# Patient Record
Sex: MALE | Race: WHITE | NOT HISPANIC OR LATINO | Employment: OTHER | URBAN - NONMETROPOLITAN AREA
[De-identification: names, ages, dates, MRNs, and addresses within clinical notes are randomized per-mention and may not be internally consistent; named-entity substitution may affect disease eponyms.]

---

## 2023-04-01 ENCOUNTER — HOSPITAL ENCOUNTER (EMERGENCY)
Facility: HOSPITAL | Age: 28
Discharge: HOME | End: 2023-04-02
Attending: EMERGENCY MEDICINE

## 2023-04-01 DIAGNOSIS — R41.82 AMS (ALTERED MENTAL STATUS): ICD-10-CM

## 2023-04-01 DIAGNOSIS — F10.929 ACUTE ALCOHOL INTOXICATION: Primary | ICD-10-CM

## 2023-04-01 LAB
ALBUMIN SERPL BCP-MCNC: 3.9 G/DL (ref 3.5–5.2)
ALP SERPL-CCNC: 68 U/L (ref 55–135)
ALT SERPL W/O P-5'-P-CCNC: 36 U/L (ref 10–44)
AMPHET+METHAMPHET UR QL: NEGATIVE
ANION GAP SERPL CALC-SCNC: 4 MMOL/L (ref 8–16)
APAP SERPL-MCNC: <2 UG/ML (ref 10–20)
AST SERPL-CCNC: 14 U/L (ref 10–40)
BARBITURATES UR QL SCN>200 NG/ML: NEGATIVE
BASOPHILS # BLD AUTO: 0.08 K/UL (ref 0–0.2)
BASOPHILS NFR BLD: 0.8 % (ref 0–1.9)
BENZODIAZ UR QL SCN>200 NG/ML: NEGATIVE
BILIRUB SERPL-MCNC: 0.5 MG/DL (ref 0.1–1)
BILIRUB UR QL STRIP: NEGATIVE
BUN SERPL-MCNC: 7 MG/DL (ref 6–20)
BZE UR QL SCN: NEGATIVE
CALCIUM SERPL-MCNC: 8.1 MG/DL (ref 8.7–10.5)
CANNABINOIDS UR QL SCN: NEGATIVE
CHLORIDE SERPL-SCNC: 111 MMOL/L (ref 95–110)
CLARITY UR: CLEAR
CO2 SERPL-SCNC: 28 MMOL/L (ref 23–29)
COLOR UR: YELLOW
CREAT SERPL-MCNC: 1 MG/DL (ref 0.5–1.4)
CREAT UR-MCNC: 22.5 MG/DL (ref 23–375)
DIFFERENTIAL METHOD: ABNORMAL
EOSINOPHIL # BLD AUTO: 0 K/UL (ref 0–0.5)
EOSINOPHIL NFR BLD: 0.3 % (ref 0–8)
ERYTHROCYTE [DISTWIDTH] IN BLOOD BY AUTOMATED COUNT: 11.9 % (ref 11.5–14.5)
EST. GFR  (NO RACE VARIABLE): >60 ML/MIN/1.73 M^2
ETHANOL SERPL-MCNC: 363 MG/DL
GLUCOSE SERPL-MCNC: 101 MG/DL (ref 70–110)
GLUCOSE UR QL STRIP: NEGATIVE
HCT VFR BLD AUTO: 47.2 % (ref 40–54)
HGB BLD-MCNC: 16.1 G/DL (ref 14–18)
HGB UR QL STRIP: NEGATIVE
IMM GRANULOCYTES # BLD AUTO: 0.06 K/UL (ref 0–0.04)
IMM GRANULOCYTES NFR BLD AUTO: 0.6 % (ref 0–0.5)
KETONES UR QL STRIP: NEGATIVE
LEUKOCYTE ESTERASE UR QL STRIP: NEGATIVE
LYMPHOCYTES # BLD AUTO: 2.5 K/UL (ref 1–4.8)
LYMPHOCYTES NFR BLD: 26.1 % (ref 18–48)
MAGNESIUM SERPL-MCNC: 2.5 MG/DL (ref 1.6–2.6)
MCH RBC QN AUTO: 29.5 PG (ref 27–31)
MCHC RBC AUTO-ENTMCNC: 34.1 G/DL (ref 32–36)
MCV RBC AUTO: 87 FL (ref 82–98)
METHADONE UR QL SCN>300 NG/ML: NEGATIVE
MONOCYTES # BLD AUTO: 0.5 K/UL (ref 0.3–1)
MONOCYTES NFR BLD: 4.8 % (ref 4–15)
NEUTROPHILS # BLD AUTO: 6.4 K/UL (ref 1.8–7.7)
NEUTROPHILS NFR BLD: 67.4 % (ref 38–73)
NITRITE UR QL STRIP: NEGATIVE
NRBC BLD-RTO: 0 /100 WBC
OPIATES UR QL SCN: NEGATIVE
PCP UR QL SCN>25 NG/ML: NEGATIVE
PH UR STRIP: 6 [PH] (ref 5–8)
PLATELET # BLD AUTO: 286 K/UL (ref 150–450)
PMV BLD AUTO: 9.5 FL (ref 9.2–12.9)
POCT GLUCOSE: 98 MG/DL (ref 70–110)
POTASSIUM SERPL-SCNC: 3.6 MMOL/L (ref 3.5–5.1)
PROT SERPL-MCNC: 8.1 G/DL (ref 6–8.4)
PROT UR QL STRIP: NEGATIVE
RBC # BLD AUTO: 5.45 M/UL (ref 4.6–6.2)
SALICYLATES SERPL-MCNC: <1.7 MG/DL (ref 15–30)
SODIUM SERPL-SCNC: 143 MMOL/L (ref 136–145)
SP GR UR STRIP: <=1.005 (ref 1–1.03)
TOXICOLOGY INFORMATION: ABNORMAL
TROPONIN I SERPL DL<=0.01 NG/ML-MCNC: 79.4 PG/ML (ref 0–60)
URN SPEC COLLECT METH UR: ABNORMAL
UROBILINOGEN UR STRIP-ACNC: NEGATIVE EU/DL
WBC # BLD AUTO: 9.5 K/UL (ref 3.9–12.7)

## 2023-04-01 PROCEDURE — 80307 DRUG TEST PRSMV CHEM ANLYZR: CPT | Performed by: EMERGENCY MEDICINE

## 2023-04-01 PROCEDURE — 36415 COLL VENOUS BLD VENIPUNCTURE: CPT | Performed by: EMERGENCY MEDICINE

## 2023-04-01 PROCEDURE — 96360 HYDRATION IV INFUSION INIT: CPT

## 2023-04-01 PROCEDURE — 82962 GLUCOSE BLOOD TEST: CPT

## 2023-04-01 PROCEDURE — 25000003 PHARM REV CODE 250: Performed by: EMERGENCY MEDICINE

## 2023-04-01 PROCEDURE — 81003 URINALYSIS AUTO W/O SCOPE: CPT | Mod: 59 | Performed by: EMERGENCY MEDICINE

## 2023-04-01 PROCEDURE — 80143 DRUG ASSAY ACETAMINOPHEN: CPT | Performed by: EMERGENCY MEDICINE

## 2023-04-01 PROCEDURE — 85025 COMPLETE CBC W/AUTO DIFF WBC: CPT | Performed by: EMERGENCY MEDICINE

## 2023-04-01 PROCEDURE — 82077 ASSAY SPEC XCP UR&BREATH IA: CPT | Performed by: EMERGENCY MEDICINE

## 2023-04-01 PROCEDURE — 84484 ASSAY OF TROPONIN QUANT: CPT | Performed by: EMERGENCY MEDICINE

## 2023-04-01 PROCEDURE — 80179 DRUG ASSAY SALICYLATE: CPT | Performed by: EMERGENCY MEDICINE

## 2023-04-01 PROCEDURE — 83735 ASSAY OF MAGNESIUM: CPT | Performed by: EMERGENCY MEDICINE

## 2023-04-01 PROCEDURE — 99285 EMERGENCY DEPT VISIT HI MDM: CPT | Mod: 25

## 2023-04-01 PROCEDURE — 80053 COMPREHEN METABOLIC PANEL: CPT | Performed by: EMERGENCY MEDICINE

## 2023-04-01 RX ADMIN — SODIUM CHLORIDE 1000 ML: 9 INJECTION, SOLUTION INTRAVENOUS at 10:04

## 2023-04-02 VITALS
BODY MASS INDEX: 27.64 KG/M2 | WEIGHT: 172 LBS | HEIGHT: 66 IN | RESPIRATION RATE: 19 BRPM | HEART RATE: 78 BPM | OXYGEN SATURATION: 100 % | SYSTOLIC BLOOD PRESSURE: 103 MMHG | DIASTOLIC BLOOD PRESSURE: 58 MMHG | TEMPERATURE: 97 F

## 2023-04-02 LAB
ETHANOL SERPL-MCNC: 299 MG/DL
TROPONIN I SERPL DL<=0.01 NG/ML-MCNC: 80.1 PG/ML (ref 0–60)

## 2023-04-02 PROCEDURE — 84484 ASSAY OF TROPONIN QUANT: CPT | Performed by: EMERGENCY MEDICINE

## 2023-04-02 PROCEDURE — 82077 ASSAY SPEC XCP UR&BREATH IA: CPT | Performed by: EMERGENCY MEDICINE

## 2023-04-02 PROCEDURE — 36415 COLL VENOUS BLD VENIPUNCTURE: CPT | Performed by: EMERGENCY MEDICINE

## 2023-04-02 NOTE — ED NOTES
Pt left ER in stable condition.  Pt ambulated with a steady even gait and was picked up by his employee who drove him home.

## 2023-04-02 NOTE — ED PROVIDER NOTES
"EMERGENCY DEPARTMENT HISTORY AND PHYSICAL EXAM     This note is dictated on M*[employer] word recognition program.  There are word recognition mistakes and grammatical errors that are occasionally missed on review.     Date: 4/1/2023   Patient Name: Misael Zheng       History of Presenting Illness      Chief Complaint   Patient presents with    Alcohol Intoxication     Pt to ED via [facility] - medic reports pt was found in a friend's front yard (awake to sternal rub) -  "has been drinking alcohol all day, ingested edibles and Vyvanse." Pt is awake and alert to voice on ED arrival.         2225   Misael Zheng is a 28 y.o. male with PMHX of unknown who presents to the emergency department C/O intoxication.    Patient arrives by EMS due to intoxication.  Patient was reportedly drinking all day.  Patient also had edibles.  He took Vyvanse.      Patient was found in friends front yd intoxicated.  Emergency department patient is intoxicated.  Minimally cooperative.  Sleeping but awakens to voice.    PCP: Primary Doctor No        No current facility-administered medications for this encounter.     No current outpatient medications on file.           Past History     Past Medical History:   No past medical history on file.     Past Surgical History:   No past surgical history on file.     Family History:   No family history on file.     Social History:         Allergies:   Review of patient's allergies indicates:  No Known Allergies       Review of Systems   Review of Systems   See HPI for pertinent positives and negatives       Physical Exam     Vitals:    04/02/23 0045 04/02/23 0130 04/02/23 0335 04/02/23 0400   BP: 112/66 (!) 92/46 108/66 (!) 103/58   Pulse: 97 91 85 78   Resp: (!) 23 (!) 21 (!) 29 19   Temp:       TempSrc:       SpO2: 100% 97% 100% 100%   Weight:       Height:          Physical Exam  Vitals and nursing note reviewed.   Constitutional:       General: He is not in acute distress.     " Appearance: Normal appearance. He is well-developed. He is not ill-appearing.   HENT:      Head: Normocephalic and atraumatic.   Eyes:      Extraocular Movements: Extraocular movements intact.      Conjunctiva/sclera: Conjunctivae normal.   Cardiovascular:      Rate and Rhythm: Normal rate.      Pulses: Normal pulses.      Heart sounds: Normal heart sounds.   Pulmonary:      Effort: Pulmonary effort is normal. No respiratory distress.      Breath sounds: Normal breath sounds.   Abdominal:      General: There is no distension.      Palpations: Abdomen is soft.      Tenderness: There is no abdominal tenderness. There is no guarding or rebound.   Musculoskeletal:         General: No deformity or signs of injury.      Cervical back: Normal range of motion. No rigidity.   Skin:     General: Skin is dry.      Coloration: Skin is not pale.      Findings: No rash.   Neurological:      General: No focal deficit present.      Mental Status: He is disoriented and confused.      GCS: GCS eye subscore is 3. GCS verbal subscore is 4. GCS motor subscore is 6.      Motor: No weakness or abnormal muscle tone.      Coordination: Coordination abnormal.            Diagnostic Study Results      Labs -   Recent Results (from the past 12 hour(s))   POCT glucose    Collection Time: 04/01/23  9:46 PM   Result Value Ref Range    POCT Glucose 98 70 - 110 mg/dL   CBC auto differential    Collection Time: 04/01/23  9:47 PM   Result Value Ref Range    WBC 9.50 3.90 - 12.70 K/uL    RBC 5.45 4.60 - 6.20 M/uL    Hemoglobin 16.1 14.0 - 18.0 g/dL    Hematocrit 47.2 40.0 - 54.0 %    MCV 87 82 - 98 fL    MCH 29.5 27.0 - 31.0 pg    MCHC 34.1 32.0 - 36.0 g/dL    RDW 11.9 11.5 - 14.5 %    Platelets 286 150 - 450 K/uL    MPV 9.5 9.2 - 12.9 fL    Immature Granulocytes 0.6 (H) 0.0 - 0.5 %    Gran # (ANC) 6.4 1.8 - 7.7 K/uL    Immature Grans (Abs) 0.06 (H) 0.00 - 0.04 K/uL    Lymph # 2.5 1.0 - 4.8 K/uL    Mono # 0.5 0.3 - 1.0 K/uL    Eos # 0.0 0.0 - 0.5 K/uL     Baso # 0.08 0.00 - 0.20 K/uL    nRBC 0 0 /100 WBC    Gran % 67.4 38.0 - 73.0 %    Lymph % 26.1 18.0 - 48.0 %    Mono % 4.8 4.0 - 15.0 %    Eosinophil % 0.3 0.0 - 8.0 %    Basophil % 0.8 0.0 - 1.9 %    Differential Method Automated    Comprehensive metabolic panel    Collection Time: 04/01/23  9:47 PM   Result Value Ref Range    Sodium 143 136 - 145 mmol/L    Potassium 3.6 3.5 - 5.1 mmol/L    Chloride 111 (H) 95 - 110 mmol/L    CO2 28 23 - 29 mmol/L    Glucose 101 70 - 110 mg/dL    BUN 7 6 - 20 mg/dL    Creatinine 1.0 0.5 - 1.4 mg/dL    Calcium 8.1 (L) 8.7 - 10.5 mg/dL    Total Protein 8.1 6.0 - 8.4 g/dL    Albumin 3.9 3.5 - 5.2 g/dL    Total Bilirubin 0.5 0.1 - 1.0 mg/dL    Alkaline Phosphatase 68 55 - 135 U/L    AST 14 10 - 40 U/L    ALT 36 10 - 44 U/L    Anion Gap 4 (L) 8 - 16 mmol/L    eGFR >60.0 >60 mL/min/1.73 m^2   Ethanol    Collection Time: 04/01/23  9:47 PM   Result Value Ref Range    Alcohol, Serum 363 (HH) <10 mg/dL   Acetaminophen level    Collection Time: 04/01/23  9:47 PM   Result Value Ref Range    Acetaminophen (Tylenol), Serum <2.0 (L) 10.0 - 20.0 ug/mL   Salicylate level    Collection Time: 04/01/23  9:47 PM   Result Value Ref Range    Salicylate Lvl <1.7 (L) 15.0 - 30.0 mg/dL   Troponin I High Sensitivity    Collection Time: 04/01/23  9:47 PM   Result Value Ref Range    Troponin I High Sensitivity 79.4 (H) 0.0 - 60.0 pg/mL   Magnesium    Collection Time: 04/01/23  9:47 PM   Result Value Ref Range    Magnesium 2.5 1.6 - 2.6 mg/dL   Urinalysis - Clean Catch    Collection Time: 04/01/23 10:02 PM   Result Value Ref Range    Specimen UA Urine, Clean Catch     Color, UA Yellow Yellow, Straw, Kim    Appearance, UA Clear Clear    pH, UA 6.0 5.0 - 8.0    Specific Gravity, UA <=1.005 (A) 1.005 - 1.030    Protein, UA Negative Negative    Glucose, UA Negative Negative    Ketones, UA Negative Negative    Bilirubin (UA) Negative Negative    Occult Blood UA Negative Negative    Nitrite, UA Negative Negative     Urobilinogen, UA Negative <2.0 EU/dL    Leukocytes, UA Negative Negative   Drug screen panel, emergency    Collection Time: 04/01/23 10:02 PM   Result Value Ref Range    Benzodiazepines Negative Negative    Methadone metabolites Negative Negative    Cocaine (Metab.) Negative Negative    Opiate Scrn, Ur Negative Negative    Barbiturate Screen, Ur Negative Negative    Amphetamine Screen, Ur Negative Negative    THC Negative Negative    Phencyclidine Negative Negative    Creatinine, Urine 22.5 (L) 23.0 - 375.0 mg/dL    Toxicology Information SEE COMMENT    TROPONIN I HIGH SENSITIVITY    Collection Time: 04/02/23  1:00 AM   Result Value Ref Range    Troponin I High Sensitivity 80.1 (H) 0.0 - 60.0 pg/mL   Ethanol    Collection Time: 04/02/23  1:00 AM   Result Value Ref Range    Alcohol, Serum 299 (H) <10 mg/dL        Radiologic Studies -    No orders to display        Medications given in the ED-   Medications   sodium chloride 0.9% bolus 1,000 mL 1,000 mL (0 mLs Intravenous Stopped 4/1/23 2302)   sodium chloride 0.9% bolus 1,000 mL 1,000 mL (0 mLs Intravenous Stopped 4/1/23 2359)           Medical Decision Making    I am the first provider for this patient.     I reviewed the vital signs, available nursing notes, past medical history, past surgical history, family history and social history.     Vital Signs:  Reviewed the patient's vital signs.     Pulse Oximetry Analysis and Interpretation:    98% on Room Air, normal      EKG Interpretation: (Per my independent interpretation, pending formal read)   Interpreted by Levi Cuba MD at 2359   Sinus tachycardia rate of 102, normal intervals, normal axis, no ST elevation       External Test Results (Pertinent to encounter):    Records Reviewed: Nursing Notes and PMPAware     History Obtained By: EMS    Provider Notes: Misael Zheng is a 28 y.o. male with intoxication    Co-morbidities Considered:  No known comorbidities, limited history    Differential  Diagnosis:  Intoxication      ED Course:    Labs reviewed.  Markedly elevated alcohol.  Chemistry and tox panel unremarkable.  Mildly elevated high sensitive troponin likely demand/dehydration.  Reviewed PMPAware - no vyvanse rx seen.     1:51 AM  Repeat trop unchanged. Patient sleeping.     4:25 AM  Patient awake alert and oriented.  Admits to alcohol use tonight.  Denies complaints.  Requesting discharge.  Patient appears to be mentating appropriately.  Discussed with him his plan.  Patient says he will go home and sleep.  Instructed patient to have no more alcohol tonight.  Patient is in agreement with this.  He denies any intent of self-harm.  Denies stating that he has an alcohol use disorder.  Patient not interested in referrals for alcohol use treatment.  He will be discharged in the care of his friend.         Problems Addressed:  EtOH intox    Procedures:   Procedures       Diagnosis and Disposition     Critical Care:      DISCHARGE NOTE:       Misael Zheng's  results have been reviewed with him.  He has been counseled regarding his diagnosis, treatment, and plan.  He verbally conveys understanding and agreement of the signs, symptoms, diagnosis, treatment and prognosis and additionally agrees to follow up as discussed.  He also agrees with the care-plan and conveys that all of his questions have been answered.  I have also provided discharge instructions for him that include: educational information regarding their diagnosis and treatment, and list of reasons why they would want to return to the ED prior to their follow-up appointment, should his condition change. He has been provided with education for proper emergency department utilization.         CLINICAL IMPRESSION:         1. Acute alcohol intoxication    2. AMS (altered mental status)              PLAN:   1. Discharge Home  2.      Medication List      You have not been prescribed any medications.        3. [facility]  [place]  [address] [address]  [phone]  Schedule an appointment as soon as possible for a visit   As needed    [facility] Emergency Department  [address] [id number] [phone]  Go to   If symptoms worsen       _______________________________     Please note that this dictation was completed with [email or web address], the computer voice recognition software.  Quite often unanticipated grammatical, syntax, homophones, and other interpretive errors are inadvertently transcribed by the computer software.  Please disregard these errors.  Please excuse any errors that have escaped final proofreading.             Levi Cuba MD  04/02/23 0427